# Patient Record
Sex: MALE
[De-identification: names, ages, dates, MRNs, and addresses within clinical notes are randomized per-mention and may not be internally consistent; named-entity substitution may affect disease eponyms.]

---

## 2023-07-10 ENCOUNTER — TRANSCRIPTION ENCOUNTER (OUTPATIENT)
Age: 68
End: 2023-07-10

## 2023-07-13 PROBLEM — Z00.00 ENCOUNTER FOR PREVENTIVE HEALTH EXAMINATION: Status: ACTIVE | Noted: 2023-07-13

## 2023-08-02 ENCOUNTER — APPOINTMENT (OUTPATIENT)
Dept: SURGERY | Facility: CLINIC | Age: 68
End: 2023-08-02

## 2023-08-02 ENCOUNTER — APPOINTMENT (OUTPATIENT)
Dept: SURGERY | Facility: CLINIC | Age: 68
End: 2023-08-02
Payer: COMMERCIAL

## 2023-08-02 PROCEDURE — 99204 OFFICE O/P NEW MOD 45 MIN: CPT | Mod: 95

## 2023-08-02 NOTE — PLAN
[FreeTextEntry1] : LIH, presents for pure tissue repair with shouldice procedure.   adequate bmi,  pt counseled on open LIH shouldice technique, risks/benefits/contraindications discussed including but not limited to bleeding, infection, nerve/vessel injury, surgically transecting the cremasterics including the genital branch of the gentofemoral nerve  which runs in the cremasterics, recurrance rates, chronic pain, sutures used etc... pt agreable to proceed.

## 2023-08-02 NOTE — REASON FOR VISIT
[Initial Evaluation] : an initial evaluation [FreeTextEntry1] : left groin pain with activity, including arousment, with discomfort felt at the root of the penis.  He had an mri pelvis noncon recently showing a fat containing LIH, and confirmed by surgeon in [place].  I have asked for both reports, includiing the cd images.  He denies any obstructive symptoms

## 2023-08-02 NOTE — PHYSICAL EXAM
[de-identified] : NAD [de-identified] : Left groin bulge on valsalva according to pt from surgical eval [de-identified] : reports 2 testicles in scrotal sac

## 2023-08-16 ENCOUNTER — APPOINTMENT (OUTPATIENT)
Dept: SURGERY | Facility: CLINIC | Age: 68
End: 2023-08-16
Payer: COMMERCIAL

## 2023-08-16 DIAGNOSIS — K42.9 UMBILICAL HERNIA W/OUT OBSTRUCTION OR GANGRENE: ICD-10-CM

## 2023-08-16 DIAGNOSIS — K40.90 UNILATERAL INGUINAL HERNIA, W/OUT OBSTRUCTION OR GANGRENE, NOT SPECIFIED AS RECURRENT: ICD-10-CM

## 2023-08-16 PROCEDURE — 99204 OFFICE O/P NEW MOD 45 MIN: CPT | Mod: 95

## 2023-08-16 NOTE — PLAN
[FreeTextEntry1] : 67-year-old male with umbilical hernia based on imaging and physical exam.  No obvious left inguinal hernia is appreciated.  Plan is for the patient to lose some more weight and he will call us back if and when he wants this repaired.  He desires to have a pure tissue repair.

## 2023-08-16 NOTE — REASON FOR VISIT
[Follow-Up: _____] : a [unfilled] follow-up visit [FreeTextEntry1] : Left groin discomfort, umbilical hernia.  Patient is at home in [place] and I am in my office in [facility].  Today's visit is to clarify the umbilical hernia presents in no obvious left inguinal hernia.  He does complain about some discomfort with some activity in the left groin.  But no obvious imaging such as a CAT scan in January of this year and an MRI this year has shown any left groin hernia.  Or issues with his core muscles.

## 2023-08-16 NOTE — PHYSICAL EXAM
[de-identified] : Healthy-appearing [de-identified] : Umbilical bulge reducible visualized on video.  No obvious signs of infection.

## 2023-12-18 ENCOUNTER — APPOINTMENT (OUTPATIENT)
Dept: SURGERY | Facility: CLINIC | Age: 68
End: 2023-12-18

## 2023-12-19 ENCOUNTER — APPOINTMENT (OUTPATIENT)
Dept: SURGERY | Facility: HOSPITAL | Age: 68
End: 2023-12-19